# Patient Record
(demographics unavailable — no encounter records)

---

## 2025-01-02 NOTE — HISTORY OF PRESENT ILLNESS
[de-identified] : 65 year old woman with hx of MGUS here to transfer from another practice in [facility]. \par  PCP checked labs for osteoporosis , + for MGUS. IgG Kappa monoclonal 2017. No BM biopsy at time diagnosis. No xrays or scans. \par  Following with labs and urine. Everything has been fine and stable , She has had no signs of progression \par  Tingling on right hand is relatively new \par  Not taking any medication for osteoporosis. \par  Taking vit d and walking for osteoporosis, \par  Monitored for hepatitis B , take medication, follows with GI \par  Labs from 6/2020 revealing elevated lambda light chain of 30, no paraproteins on SPEP, Ig levels normal \par  \par   [de-identified] : Patient seen and examined today for routine follow up for the management of MGUS. She presents today feeling overall well without any complaints. 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 Starting PT for shoulder tendinitis.  She denies fevers/chills, HA, dizziness, SOB, cough, CP, palpitations, abd pain, n/v/d, swelling to extremities, back pain, hematuria, BRBPR, abnormal vaginal bleeding, night sweats.   Health Maintenance:  Mammo: 8/2023 GYN: 12/2022 PAP done Dr. Darling s/p US pelvis and TVUS for abnormal bleeding revealing limited study due to inability to visualize the bilateral adnexa from interposed bowel gas. Unremarkable uterus and endometrium. Due this year 2024 Colonoscopy: 4/2021 normal per patient. Follows with GI Dr. Tijerina does US liver q6mos for hemangiomas ans also is followed by PCP for angiomyolipoma of kidney s/p recent MRI abd/pelvis 4/2023 revealing 8 mm angiomyolipoma left lower pole kidney.  A couple of hepatic cavernous hemangiomas measuring up to 1.1 cm. Tiny liver  cyst.  Skeletal Survey: 2021 unremarkable skeletal survey  PCP: Dr. Landry  Endo: Dr. Cheng yearly for thyroid nodules, declines DEXA or bisphosphonates for osteoporosis

## 2025-01-02 NOTE — ASSESSMENT
[FreeTextEntry1] : This is a 64 y/ o woman with hx of osteopenia found to have IgG Kappa MGUS here for routine follow up.   #MGUS -Reviewed risk of progression to myeloma or lymphoma in the future -SPEP and Ig levels are stable -Repeat Mspike today -24 hour urine shows no evidence of Bence-Moctezuma proteins or monoclonal spike however does show a slightly elevated overall total protein level in 24 hours. -No evidence of end organ damage -Skeletal survey 1/2021 without evidence of bone disease. Declines further imaging at this time. -1/2023 vs reviewed. cbc and cmet reviewed. pending SPEP with HUY, upep with HUY and immunoglobulins. No evidence of anemia, hypercalcemia or renal failure - 7/11/23 vs and CBC reviewed; WBC 5.02. hgb 15.1, plt 223. Reviewed myeloma labs from last visit, no longer has monoclonal band and has normal electrophoresis pattern. Repeat today. No evidence of anemia, hypercalcemia, renal failure. Continue to monitor. Remains stable  #Goiter - Monitored by Dr. Cheng Endocrinology - Patient states she has yearly Thyroid US. Obtain records.  #osteoporosis - Discussed the benefit of vit d and exercise - Recommend DEXA. Rx previously given by oscar but does not plan to have the scan performed until 5/2023. I strongly advised her to get it done sooner and consider resorptive therapies if warranted to reduce risk of fracture. - Patient continues to decline and does not want to do DEXA as planned in 5/2023 as she is "scared to take any medications" - 7/11/23 as above patient continues to decline repeat DEXA imaging and medications for osteoproris. She continues to follow with Oscar Cheng  #Neuropathy - Normal B12/Folate/TSH - 7/11/23 intermittent and worse in morning. Monitor  #Health Maintenance - Mammo: due 8/2023 has appt scheduled. Does at [facility] - GYN: 12/2022 PAP done Dr. Darling s/p US pelvis and TVUS for abnormal bleeding revealing limited study due to inability to visualize the bilateral adnexa from interposed bowel gas. Unremarkable uterus and endometrium. - Colonoscopy: 4/2021 normal per patient. Follows with GI Dr. Tijerina does US liver q6mos for hemangiomas ans also is followed by PCP for angiomyolipoma of kidney s/p recent MRI abd/pelvis 4/2023 revealing 8 mm angiomyolipoma left lower pole kidney. A couple of hepatic cavernous hemangiomas measuring up to 1.1 cm. Tiny liver cyst. - Skeletal Survey: 2021 unremarkable skeletal survey - PCP: Dr. Landry - Endo: Dr. Cheng yearly for goiter, declines DEXA or bisphosphonates for osteoporosis    RTC in 6-9 months with CBC with diff, CMP, LDH, myeloma labs and urines, immunoglobulins
(4) walks frequently

## 2025-07-22 NOTE — HISTORY OF PRESENT ILLNESS
[de-identified] : 65 year old woman with hx of MGUS here to transfer from another practice in [facility]. \par  PCP checked labs for osteoporosis , + for MGUS. IgG Kappa monoclonal 2017. No BM biopsy at time diagnosis. No xrays or scans. \par  Following with labs and urine. Everything has been fine and stable , She has had no signs of progression \par  Tingling on right hand is relatively new \par  Not taking any medication for osteoporosis. \par  Taking vit d and walking for osteoporosis, \par  Monitored for hepatitis B , take medication, follows with GI \par  Labs from 6/2020 revealing elevated lambda light chain of 30, no paraproteins on SPEP, Ig levels normal \par  \par   [de-identified] : Patient seen and examined today for routine follow up for the management of MGUS. She presents today feeling overall well without any complaints.  She denies fevers/chills, HA, dizziness, SOB, cough, CP, palpitations, abd pain, n/v/d, swelling to extremities, back pain, hematuria, BRBPR, abnormal vaginal bleeding, night sweats.  Patient to go next Friday for RFA for thyroid at [facility] due to increase in thyroid nodule.   Health Maintenance:  Mammo: 8/2023 GYN: 12/2024 PAP normal, goes every other year Colonoscopy: 4/2021 normal per patient. Follows with GI Dr. Tijerina does US liver q6mos for hemangiomas ans also is followed by PCP for angiomyolipoma of kidney s/p recent MRI abd/pelvis 4/2023 revealing 8 mm angiomyolipoma left lower pole kidney.  A couple of hepatic cavernous hemangiomas measuring up to 1.1 cm. Tiny liver cyst.  Skeletal Survey: 2021 unremarkable skeletal survey  PCP: Dr. Landry  Endo: Dr. Cheng yearly for thyroid nodules, declines DEXA or bisphosphonates for osteoporosis

## 2025-07-22 NOTE — ASSESSMENT
[FreeTextEntry1] : This is a 64 y/ o woman with hx of osteopenia found to have IgG Kappa MGUS here for routine follow up.   #MGUS -Reviewed risk of progression to myeloma or lymphoma in the future -SPEP and Ig levels are stable -Repeat Mspike today -24 hour urine shows no evidence of Bence-Moctezuma proteins or monoclonal spike however does show a slightly elevated overall total protein level in 24 hours. -No evidence of end organ damage -Skeletal survey 1/2021 without evidence of bone disease. Declines further imaging at this time. vs reviewed. cbc and cmet reviewed. pending SPEP with HUY, upep with HUY and immunoglobulins. No evidence of anemia, hypercalcemia or renal failure  #Goiter/ thyroid nodule - Monitored by Dr. Cheng Endocrinology - Patient states she has yearly Thyroid US. Obtain records.  next Friday for RFA for thyroid at [facility] due to increase in thyroid nodule.  #osteoporosis - Discussed the benefit of vit d and exercise - continues to decline repeat DEXA imaging and medications for osteoproris. She continues to follow with Endo Dr. Cheng  #Neuropathy - Normal B12/Folate/TSH - 7/11/23 intermittent and worse in morning. Monitor  #Health Maintenance - Mammo: Does at [facility] - GYN:  Dr. Peterson  - Colonoscopy: 4/2021 normal per patient. Follows with GI Dr. Tijerina does US liver q6mos for hemangiomas ans also is followed by PCP for angiomyolipoma of kidney s/p recent MRI abd/pelvis 4/2023 revealing 8 mm angiomyolipoma left lower pole kidney. A couple of hepatic cavernous hemangiomas measuring up to 1.1 cm. Tiny liver cyst. - Skeletal Survey: 2021 unremarkable skeletal survey - PCP: Dr. Landry - Endo: Dr. Cheng yearly for goiter, declines DEXA or bisphosphonates for osteoporosis    RTC in 6 months with CBC with diff, CMP, LDH, myeloma labs and urines, immunoglobulins

## 2025-07-22 NOTE — HISTORY OF PRESENT ILLNESS
[de-identified] : 65 year old woman with hx of MGUS here to transfer from another practice in [facility]. \par  PCP checked labs for osteoporosis , + for MGUS. IgG Kappa monoclonal 2017. No BM biopsy at time diagnosis. No xrays or scans. \par  Following with labs and urine. Everything has been fine and stable , She has had no signs of progression \par  Tingling on right hand is relatively new \par  Not taking any medication for osteoporosis. \par  Taking vit d and walking for osteoporosis, \par  Monitored for hepatitis B , take medication, follows with GI \par  Labs from 6/2020 revealing elevated lambda light chain of 30, no paraproteins on SPEP, Ig levels normal \par  \par   [de-identified] : Patient seen and examined today for routine follow up for the management of MGUS. She presents today feeling overall well without any complaints.  She denies fevers/chills, HA, dizziness, SOB, cough, CP, palpitations, abd pain, n/v/d, swelling to extremities, back pain, hematuria, BRBPR, abnormal vaginal bleeding, night sweats.  Patient to go next Friday for RFA for thyroid at [facility] due to increase in thyroid nodule.   Health Maintenance:  Mammo: 8/2023 GYN: 12/2024 PAP normal, goes every other year Colonoscopy: 4/2021 normal per patient. Follows with GI Dr. Tijerina does US liver q6mos for hemangiomas ans also is followed by PCP for angiomyolipoma of kidney s/p recent MRI abd/pelvis 4/2023 revealing 8 mm angiomyolipoma left lower pole kidney.  A couple of hepatic cavernous hemangiomas measuring up to 1.1 cm. Tiny liver cyst.  Skeletal Survey: 2021 unremarkable skeletal survey  PCP: Dr. Landry  Endo: Dr. Cheng yearly for thyroid nodules, declines DEXA or bisphosphonates for osteoporosis

## 2025-07-22 NOTE — ASSESSMENT
[FreeTextEntry1] : This is a 64 y/ o woman with hx of osteopenia found to have IgG Kappa MGUS here for routine follow up.   #MGUS -Reviewed risk of progression to myeloma or lymphoma in the future -SPEP and Ig levels are stable -Repeat Mspike today -24 hour urine shows no evidence of Bence-Moctezuma proteins or monoclonal spike however does show a slightly elevated overall total protein level in 24 hours. -No evidence of end organ damage -Skeletal survey 1/2021 without evidence of bone disease. Declines further imaging at this time. vs reviewed. cbc and cmet reviewed. pending SPEP with HUY, upep with HUY and immunoglobulins. No evidence of anemia, hypercalcemia or renal failure  #Goiter/ thyroid nodule - Monitored by Dr. Cheng Endocrinology - Patient states she has yearly Thyroid US. Obtain records.  next Friday for RFA for thyroid at [facility] due to increase in thyroid nodule.  #osteoporosis - Discussed the benefit of vit d and exercise - continues to decline repeat DEXA imaging and medications for osteoproris. She continues to follow with Endo Dr. Cheng  #Neuropathy - Normal B12/Folate/TSH - 7/11/23 intermittent and worse in morning. Monitor  #Health Maintenance - Mammo: Does at [facility] - GYN:  Dr. Peterson  - Colonoscopy: 4/2021 normal per patient. Follows with GI Dr. Tijerina does US liver q6mos for hemangiomas ans also is followed by PCP for angiomyolipoma of kidney s/p recent MRI abd/pelvis 4/2023 revealing 8 mm angiomyolipoma left lower pole kidney. A couple of hepatic cavernous hemangiomas measuring up to 1.1 cm. Tiny liver cyst. - Skeletal Survey: 2021 unremarkable skeletal survey - PCP: Dr. Landry - Endo: Dr. Cheng yearly for goiter, declines DEXA or bisphosphonates for osteoporosis    RTC in 6 months with CBC with diff, CMP, LDH, myeloma labs and urines, immunoglobulins

## 2025-07-22 NOTE — REVIEW OF SYSTEMS
[FreeTextEntry9] : shoulder pain [Patient Intake Form Reviewed] : Patient intake form was reviewed [Negative] : Musculoskeletal [Fever] : no fever [Chills] : no chills [Night Sweats] : no night sweats [Fatigue] : no fatigue [Dizziness] : no dizziness